# Patient Record
Sex: FEMALE | Employment: FULL TIME | URBAN - METROPOLITAN AREA
[De-identification: names, ages, dates, MRNs, and addresses within clinical notes are randomized per-mention and may not be internally consistent; named-entity substitution may affect disease eponyms.]

---

## 2023-06-23 ENCOUNTER — OFFICE VISIT (OUTPATIENT)
Facility: CLINIC | Age: 23
End: 2023-06-23
Payer: COMMERCIAL

## 2023-06-23 VITALS
DIASTOLIC BLOOD PRESSURE: 80 MMHG | BODY MASS INDEX: 39.44 KG/M2 | HEIGHT: 64 IN | SYSTOLIC BLOOD PRESSURE: 122 MMHG | HEART RATE: 98 BPM | WEIGHT: 231 LBS

## 2023-06-23 DIAGNOSIS — N92.6 IRREGULAR BLEEDING: Primary | ICD-10-CM

## 2023-06-23 PROCEDURE — 3008F BODY MASS INDEX DOCD: CPT | Performed by: OBSTETRICS & GYNECOLOGY

## 2023-06-23 PROCEDURE — 3079F DIAST BP 80-89 MM HG: CPT | Performed by: OBSTETRICS & GYNECOLOGY

## 2023-06-23 PROCEDURE — 3074F SYST BP LT 130 MM HG: CPT | Performed by: OBSTETRICS & GYNECOLOGY

## 2023-06-23 PROCEDURE — 99203 OFFICE O/P NEW LOW 30 MIN: CPT | Performed by: OBSTETRICS & GYNECOLOGY

## 2023-06-23 RX ORDER — OMEPRAZOLE 40 MG/1
CAPSULE, DELAYED RELEASE ORAL
COMMUNITY
Start: 2023-05-24

## 2023-08-11 ENCOUNTER — TELEPHONE (OUTPATIENT)
Facility: CLINIC | Age: 23
End: 2023-08-11

## 2023-08-11 DIAGNOSIS — R79.89 LOW VITAMIN D LEVEL: Primary | ICD-10-CM

## 2023-08-11 NOTE — TELEPHONE ENCOUNTER
Request Vitamin D level -pended    TTC   She thinks she might have PCOS- trying to lose wt. Patient has some question on taking supplemental vitamins- can take PNV, folic acid, Vitamin D- pt thinks this was low before. Will route request, if approved will see mychart msg. If TTC for more than 6 mo. Advised sched. Appt to discuss further POC. Patient verbalized understanding, agreed to and intend to comply with plan of care.

## 2023-10-03 ENCOUNTER — HOSPITAL ENCOUNTER (OUTPATIENT)
Dept: GENERAL RADIOLOGY | Age: 23
Discharge: HOME | End: 2023-10-03
Attending: INTERNAL MEDICINE
Payer: COMMERCIAL

## 2023-10-03 ENCOUNTER — OFFICE VISIT (OUTPATIENT)
Dept: INTERNAL MEDICINE CLINIC | Facility: CLINIC | Age: 23
End: 2023-10-03

## 2023-10-03 VITALS
HEIGHT: 64 IN | RESPIRATION RATE: 18 BRPM | SYSTOLIC BLOOD PRESSURE: 116 MMHG | WEIGHT: 230.38 LBS | HEART RATE: 81 BPM | DIASTOLIC BLOOD PRESSURE: 79 MMHG | BODY MASS INDEX: 39.33 KG/M2

## 2023-10-03 DIAGNOSIS — M79.672 CHRONIC PAIN OF BOTH FEET: ICD-10-CM

## 2023-10-03 DIAGNOSIS — G89.29 CHRONIC PAIN OF BOTH FEET: ICD-10-CM

## 2023-10-03 DIAGNOSIS — H61.031: ICD-10-CM

## 2023-10-03 DIAGNOSIS — M79.671 CHRONIC PAIN OF BOTH FEET: ICD-10-CM

## 2023-10-03 DIAGNOSIS — M72.2 PLANTAR FASCIITIS, BILATERAL: Primary | ICD-10-CM

## 2023-10-03 PROCEDURE — 99204 OFFICE O/P NEW MOD 45 MIN: CPT | Performed by: INTERNAL MEDICINE

## 2023-10-03 PROCEDURE — 3008F BODY MASS INDEX DOCD: CPT | Performed by: INTERNAL MEDICINE

## 2023-10-03 PROCEDURE — 73630 X-RAY EXAM OF FOOT: CPT | Performed by: INTERNAL MEDICINE

## 2023-10-03 PROCEDURE — 3078F DIAST BP <80 MM HG: CPT | Performed by: INTERNAL MEDICINE

## 2023-10-03 PROCEDURE — 3074F SYST BP LT 130 MM HG: CPT | Performed by: INTERNAL MEDICINE

## 2023-10-03 RX ORDER — CEFADROXIL 500 MG/5ML
500 POWDER, FOR SUSPENSION ORAL 2 TIMES DAILY
Qty: 20 EACH | Refills: 0 | Status: SHIPPED | OUTPATIENT
Start: 2023-10-03

## 2023-10-04 ENCOUNTER — TELEPHONE (OUTPATIENT)
Dept: INTERNAL MEDICINE CLINIC | Facility: CLINIC | Age: 23
End: 2023-10-04

## 2023-10-04 NOTE — TELEPHONE ENCOUNTER
[facility] calling to confirm quantity of Cefadroxil. Advised below. She changed it to a 10 day supply. Medication Quantity Refills Start End   Cefadroxil 500 MG/5ML Oral Recon Susp 20 each 0 10/3/2023    Sig:   Take 5 mL (500 mg total) by mouth 2 (two) times daily.

## 2023-10-08 PROBLEM — H61.031: Status: ACTIVE | Noted: 2023-10-08

## 2023-10-08 PROBLEM — E66.09 CLASS 2 OBESITY DUE TO EXCESS CALORIES WITHOUT SERIOUS COMORBIDITY IN ADULT: Status: ACTIVE | Noted: 2023-10-08

## 2023-10-09 ENCOUNTER — TELEPHONE (OUTPATIENT)
Dept: INTERNAL MEDICINE CLINIC | Facility: CLINIC | Age: 23
End: 2023-10-09

## 2023-10-09 RX ORDER — CEFADROXIL 500 MG/1
500 CAPSULE ORAL 2 TIMES DAILY
Qty: 20 CAPSULE | Refills: 0 | Status: SHIPPED | OUTPATIENT
Start: 2023-10-09

## 2023-10-09 NOTE — TELEPHONE ENCOUNTER
Please notify patient that I sent prescription for same antibiotic  in capsules, I made mistake sending oral suspension previously.

## 2023-10-09 NOTE — TELEPHONE ENCOUNTER
Per patient doctor have given her a liquid anti-biotic and it needs to be refrigerated and patient is going for a road trip so patient would like to know if doctor can replace that medication into a different antibiotic or what to do? Please advise, patient is leaving by Friday 10/13/2023.

## 2023-10-09 NOTE — TELEPHONE ENCOUNTER
Cefadroxil suspension needs to be stored in the refrigerator after reconstitution, and patient will be going for a road trip. Seeking recommendation if different antibiotic should be prescribed to replace Cefadroxil. Dr. Jonathon Grossman please advise.

## 2023-10-09 NOTE — TELEPHONE ENCOUNTER
Patient called office. Date of birth and full name both confirmed. RN informed of provider's message as detailed below. All questions/concerns addressed. She verbalizes understanding of all information, and agreeable to plan. Denies additional questions.